# Patient Record
Sex: FEMALE | Race: WHITE | Employment: STUDENT | URBAN - NONMETROPOLITAN AREA
[De-identification: names, ages, dates, MRNs, and addresses within clinical notes are randomized per-mention and may not be internally consistent; named-entity substitution may affect disease eponyms.]

---

## 2022-10-29 ENCOUNTER — HOSPITAL ENCOUNTER (EMERGENCY)
Age: 22
Discharge: HOME | End: 2022-10-29
Attending: EMERGENCY MEDICINE
Payer: COMMERCIAL

## 2022-10-29 VITALS
OXYGEN SATURATION: 99 % | RESPIRATION RATE: 16 BRPM | HEART RATE: 103 BPM | SYSTOLIC BLOOD PRESSURE: 128 MMHG | DIASTOLIC BLOOD PRESSURE: 82 MMHG | TEMPERATURE: 98.3 F

## 2022-10-29 DIAGNOSIS — J06.9 VIRAL UPPER RESPIRATORY TRACT INFECTION WITH COUGH: Primary | ICD-10-CM

## 2022-10-29 PROCEDURE — 99203 OFFICE O/P NEW LOW 30 MIN: CPT | Performed by: EMERGENCY MEDICINE

## 2022-10-29 PROCEDURE — 99203 OFFICE O/P NEW LOW 30 MIN: CPT

## 2022-10-29 RX ORDER — DEXTROMETHORPHAN HYDROBROMIDE AND PROMETHAZINE HYDROCHLORIDE 15; 6.25 MG/5ML; MG/5ML
5 SYRUP ORAL 4 TIMES DAILY PRN
Qty: 120 ML | Refills: 0 | Status: SHIPPED | OUTPATIENT
Start: 2022-10-29 | End: 2022-11-02

## 2022-10-29 RX ORDER — CETIRIZINE HYDROCHLORIDE 10 MG/1
10 TABLET ORAL DAILY
COMMUNITY

## 2022-10-29 RX ORDER — CETIRIZINE HYDROCHLORIDE, PSEUDOEPHEDRINE HYDROCHLORIDE 5; 120 MG/1; MG/1
1 TABLET, FILM COATED, EXTENDED RELEASE ORAL 2 TIMES DAILY
Qty: 30 TABLET | Refills: 0 | Status: SHIPPED | OUTPATIENT
Start: 2022-10-29 | End: 2022-11-13

## 2022-10-29 ASSESSMENT — ENCOUNTER SYMPTOMS
CHOKING: 0
WHEEZING: 0
SORE THROAT: 0
BLOOD IN STOOL: 0
DIARRHEA: 0
CONSTIPATION: 0
SHORTNESS OF BREATH: 0
ABDOMINAL PAIN: 0
EYE REDNESS: 0
TROUBLE SWALLOWING: 0
VOICE CHANGE: 1
NAUSEA: 0
VOMITING: 0
EYE PAIN: 0
ROS SKIN COMMENTS: NO RASH OR BRUISING
STRIDOR: 0
EYE DISCHARGE: 0
COUGH: 1
FACIAL SWELLING: 0
BACK PAIN: 0
SINUS PRESSURE: 0
RHINORRHEA: 1

## 2022-10-29 ASSESSMENT — PAIN - FUNCTIONAL ASSESSMENT: PAIN_FUNCTIONAL_ASSESSMENT: NONE - DENIES PAIN

## 2022-10-29 NOTE — ED PROVIDER NOTES
[place] [address]  Urgent Care Encounter      CHIEF COMPLAINT       Chief Complaint   Patient presents with    Sinus Problem       Nurses Notes reviewed and I agree except as noted in the HPI. HISTORY OF PRESENT ILLNESS   Nomi Paris is a 23 y.o. female who presents with 2-day history of sinus pressure, congestion, postnasal drainage, nasal voice, dry cough. No fever, vomiting, sore throat, ear pain, chest pain, shortness of breath, abdominal pain, vomiting, rash. No history of asthma or diabetes. Taking NyQuil without improvement. Non-smoker   Negative home COVID test.   REVIEW OF SYSTEMS     Review of Systems   Constitutional:  Positive for appetite change. Negative for chills, fatigue, fever and unexpected weight change. Decreased appetite no fever   HENT:  Positive for congestion, postnasal drip, rhinorrhea and voice change. Negative for ear discharge, ear pain, facial swelling, hearing loss, nosebleeds, sinus pressure, sore throat and trouble swallowing. Congestion, sinus pressure, postnasal drainage, hoarse voice   Eyes:  Negative for pain, discharge, redness and visual disturbance. No Redness or drainage   Respiratory:  Positive for cough. Negative for choking, shortness of breath, wheezing and stridor. Cough no shortness of breath   Cardiovascular:  Negative for chest pain and leg swelling. No chest pain or syncope   Gastrointestinal:  Negative for abdominal pain, blood in stool, constipation, diarrhea, nausea and vomiting. No abdominal pain or vomiting   Genitourinary:  Negative for dysuria, flank pain, frequency, hematuria, urgency, vaginal bleeding and vaginal discharge. No possibility of pregnancy   Musculoskeletal:  Negative for arthralgias, back pain, neck pain and neck stiffness. Skin:  Negative for rash.         No rash or bruising   Neurological:  Negative for dizziness, seizures, syncope, weakness, light-headedness and headaches. Sinus pressure no lethargy   Hematological:  Negative for adenopathy. Does not bruise/bleed easily. Psychiatric/Behavioral:  Negative for confusion, sleep disturbance and suicidal ideas. The patient is not nervous/anxious. Red and bold elements reviewed  PAST MEDICAL HISTORY   History reviewed. No pertinent past medical history. SURGICAL HISTORY     Patient  has a past surgical history that includes Dental surgery. CURRENT MEDICATIONS       Previous Medications    CETIRIZINE (ZYRTEC) 10 MG TABLET    Take 10 mg by mouth daily    METHYLPHENIDATE HCL (CONCERTA PO)    Take by mouth    UNKNOWN TO PATIENT           ALLERGIES     Patient is has No Known Allergies. FAMILY HISTORY     Patient'sfamily history is not on file. SOCIAL HISTORY     Patient  reports that she has never smoked. She has never been exposed to tobacco smoke. She has never used smokeless tobacco. She reports that she does not drink alcohol. PHYSICAL EXAM     ED TRIAGE VITALS  BP: 128/82, Temp: 98.3 °F (36.8 °C), Heart Rate: (!) 103, Resp: 16, SpO2: 99 %  Physical Exam  Vitals and nursing note reviewed. Constitutional:       General: She is not in acute distress. Appearance: She is well-developed. She is not ill-appearing. Comments: Nasal voice, dry cough, moist membranes   HENT:      Head: Normocephalic and atraumatic. Right Ear: Tympanic membrane and external ear normal.      Left Ear: Tympanic membrane and external ear normal.      Nose: Congestion and rhinorrhea present. Mouth/Throat:      Pharynx: No oropharyngeal exudate. Comments: Oropharynx normal  Eyes:      General: No scleral icterus. Right eye: No discharge. Left eye: No discharge. Extraocular Movements:      Right eye: Normal extraocular motion. Left eye: Normal extraocular motion. Conjunctiva/sclera: Conjunctivae normal.      Pupils: Pupils are equal, round, and reactive to light.       Comments: Conjunctiva clear   Neck:      Thyroid: No thyromegaly. Vascular: No JVD. Comments: No Meningismus  Cardiovascular:      Rate and Rhythm: Normal rate and regular rhythm. Heart sounds: Normal heart sounds. No murmur heard. No friction rub. No gallop. Pulmonary:      Effort: Pulmonary effort is normal. No tachypnea or respiratory distress. Breath sounds: Normal breath sounds. No stridor. No decreased breath sounds, wheezing, rhonchi or rales. Comments: Dry cough lungs clear  Chest:      Chest wall: No tenderness. Abdominal:      General: Bowel sounds are normal. There is no distension. Palpations: Abdomen is soft. There is no mass. Tenderness: There is no abdominal tenderness. There is no guarding or rebound. Musculoskeletal:         General: No tenderness. Normal range of motion. Cervical back: Normal range of motion. Comments: Extremities normal   Lymphadenopathy:      Cervical: Cervical adenopathy present. Right cervical: Superficial cervical adenopathy present. No deep cervical adenopathy. Left cervical: Superficial cervical adenopathy present. No deep cervical adenopathy. Skin:     General: Skin is warm and dry. Findings: No erythema or rash. Comments: No Rash or bruising   Neurological:      Mental Status: She is alert and oriented to person, place, and time. Cranial Nerves: No cranial nerve deficit. Motor: No abnormal muscle tone. Coordination: Coordination normal.      Deep Tendon Reflexes: Reflexes are normal and symmetric. Reflexes normal.      Comments: Appropriate no focal fine   Psychiatric:         Behavior: Behavior normal.         Thought Content: Thought content normal.         Judgment: Judgment normal.       DIAGNOSTIC RESULTS   Labs:No results found for this visit on 10/29/22.     IMAGING:  No orders to display      URGENT CARE COURSE:     Vitals:    10/29/22 1227   BP: 128/82   Pulse: (!) 103   Resp: 16 Temp: 98.3 °F (36.8 °C)   TempSrc: Temporal   SpO2: 99%       Medications - No data to display  PROCEDURES:  None  FINAL IMPRESSION      1. Viral upper respiratory tract infection with cough        DISPOSITION/PLAN   DISPOSITION Decision To Discharge 10/29/2022 12:51:05 PM  Nontoxic, well-hydrated, normal airway. No airway abscess or epiglottitis, sepsis, CNS infection, pneumonia, hypoxia, bronchospasm. No bacterial infection. Patient has a viral upper respiratory infection.   Will treat with Zyrtec-D, Phenergan DM, Tylenol, increased oral clear liquids, rest.  Patient to follow-up with PCP in 4 days if problems persist, and she understands to go to ED if worse  PATIENT REFERRED TO:  Galdino Steele. [address]  [phone]    In 4 days  Recheck in office if problems persist, go to emergency if worse  DISCHARGE MEDICATIONS:  New Prescriptions    CETIRIZINE-PSUEDOEPHEDRINE (ZYRTEC-D) 5-120 MG PER EXTENDED RELEASE TABLET    Take 1 tablet by mouth 2 times daily for 30 doses 1 p.o. twice daily for sinus pain and pressure, congestion, postnasal drainage, ear pain and pressure, cough    PROMETHAZINE-DEXTROMETHORPHAN (PROMETHAZINE-DM) 6.25-15 MG/5ML SYRUP    Take 5 mLs by mouth 4 times daily as needed for Cough Caution not at school will cause drowsiness     Current Discharge Medication List          MD Krish Schmidt MD  10/29/22 7092

## 2022-10-29 NOTE — Clinical Note
Blanca Hawkins was seen and treated in our emergency department on 10/29/2022. She may return to school on 11/01/2022. No school October 31, 2022    If you have any questions or concerns, please don't hesitate to call.       Calli Pang MD

## 2023-04-19 ENCOUNTER — HOSPITAL ENCOUNTER (EMERGENCY)
Age: 23
Discharge: HOME | End: 2023-04-19
Payer: COMMERCIAL

## 2023-04-19 VITALS
TEMPERATURE: 97.3 F | DIASTOLIC BLOOD PRESSURE: 78 MMHG | HEART RATE: 94 BPM | RESPIRATION RATE: 16 BRPM | SYSTOLIC BLOOD PRESSURE: 133 MMHG | OXYGEN SATURATION: 96 %

## 2023-04-19 DIAGNOSIS — J06.9 VIRAL URI: Primary | ICD-10-CM

## 2023-04-19 PROCEDURE — 99212 OFFICE O/P EST SF 10 MIN: CPT

## 2023-04-19 PROCEDURE — 99213 OFFICE O/P EST LOW 20 MIN: CPT

## 2023-04-19 RX ORDER — FLUTICASONE PROPIONATE 50 MCG
1 SPRAY, SUSPENSION (ML) NASAL DAILY
Qty: 16 G | Refills: 0 | Status: SHIPPED | OUTPATIENT
Start: 2023-04-19

## 2023-04-19 ASSESSMENT — ENCOUNTER SYMPTOMS
SINUS PAIN: 0
NAUSEA: 0
SINUS PRESSURE: 1
VOMITING: 0
COUGH: 0
RHINORRHEA: 0
SORE THROAT: 1

## 2023-04-19 NOTE — DISCHARGE INSTRUCTIONS
Continue daily zyrtec  Cough and Cold medication   Flonase daily  Magic mouthwash as needed for throat irritation  Tylenol and Motrin as needed   Warm steam showers  Sinus rinses  FU with PCP 3-5 days

## 2023-04-19 NOTE — ED NOTES
To [facility] with complaints of congestion and sore throat that started Monday.       Tom Long RN  04/19/23 [id number]

## 2023-04-19 NOTE — ED PROVIDER NOTES
[facility]  Urgent Care Encounter       CHIEF COMPLAINT       Chief Complaint   Patient presents with    Pharyngitis       Nurses Notes reviewed and I agree except as noted in the HPI. HISTORY OF PRESENT ILLNESS   Shawna Ayala is a 23 y.o. female who presents with complaints of congestion, and sore throat that started on Monday. Patient reports taking over-the-counter cough medication. Patient reports history of seasonal allergies and does take Zyrtec daily. Patient states that her congestion is what is bothering her the most right now. The history is provided by the patient. REVIEW OF SYSTEMS     Review of Systems   Constitutional:  Negative for fatigue and fever. HENT:  Positive for congestion, sinus pressure and sore throat. Negative for ear pain, postnasal drip, rhinorrhea and sinus pain. Respiratory:  Negative for cough. Cardiovascular:  Negative for chest pain. Gastrointestinal:  Negative for nausea and vomiting. Musculoskeletal:  Negative for myalgias. Neurological:  Negative for headaches. All other systems reviewed and are negative. PAST MEDICAL HISTORY   History reviewed. No pertinent past medical history. SURGICALHISTORY     Patient  has a past surgical history that includes Dental surgery. CURRENT MEDICATIONS       Previous Medications    CETIRIZINE (ZYRTEC) 10 MG TABLET    Take 10 mg by mouth daily    METHYLPHENIDATE HCL (CONCERTA PO)    Take by mouth    UNKNOWN TO PATIENT           ALLERGIES     Patient is has No Known Allergies. Patients   There is no immunization history on file for this patient. FAMILY HISTORY     Patient's family history is not on file. SOCIAL HISTORY     Patient  reports that she has never smoked. She has never been exposed to tobacco smoke. She has never used smokeless tobacco. She reports that she does not drink alcohol.     PHYSICAL EXAM     ED TRIAGE VITALS  BP: 133/78, Temp: 97.3 °F (36.3 °C), Heart Rate: